# Patient Record
Sex: MALE | Race: BLACK OR AFRICAN AMERICAN | NOT HISPANIC OR LATINO | URBAN - METROPOLITAN AREA
[De-identification: names, ages, dates, MRNs, and addresses within clinical notes are randomized per-mention and may not be internally consistent; named-entity substitution may affect disease eponyms.]

---

## 2017-03-21 ENCOUNTER — OFFICE (OUTPATIENT)
Dept: URBAN - METROPOLITAN AREA CLINIC 12 | Facility: CLINIC | Age: 67
End: 2017-03-21
Payer: MEDICARE

## 2017-03-21 VITALS
HEIGHT: 66 IN | SYSTOLIC BLOOD PRESSURE: 112 MMHG | DIASTOLIC BLOOD PRESSURE: 74 MMHG | HEART RATE: 117 BPM | WEIGHT: 135 LBS

## 2017-03-21 DIAGNOSIS — R13.19 OTHER DYSPHAGIA: ICD-10-CM

## 2017-03-21 DIAGNOSIS — K21.9 GASTRO-ESOPHAGEAL REFLUX DISEASE WITHOUT ESOPHAGITIS: ICD-10-CM

## 2017-03-21 DIAGNOSIS — Z79.01 LONG TERM (CURRENT) USE OF ANTICOAGULANTS: ICD-10-CM

## 2017-03-21 DIAGNOSIS — R10.13 EPIGASTRIC PAIN: ICD-10-CM

## 2017-03-21 PROCEDURE — G8427 DOCREV CUR MEDS BY ELIG CLIN: HCPCS | Performed by: INTERNAL MEDICINE

## 2017-03-21 PROCEDURE — 99204 OFFICE O/P NEW MOD 45 MIN: CPT | Performed by: INTERNAL MEDICINE

## 2017-05-11 ENCOUNTER — AMBULATORY SURGICAL CENTER (OUTPATIENT)
Dept: URBAN - METROPOLITAN AREA SURGERY 3 | Facility: SURGERY | Age: 67
End: 2017-05-11

## 2017-05-11 ENCOUNTER — OFFICE (OUTPATIENT)
Dept: URBAN - METROPOLITAN AREA CLINIC 11 | Facility: CLINIC | Age: 67
End: 2017-05-11

## 2017-05-11 VITALS
HEART RATE: 78 BPM | SYSTOLIC BLOOD PRESSURE: 122 MMHG | SYSTOLIC BLOOD PRESSURE: 115 MMHG | HEART RATE: 88 BPM | OXYGEN SATURATION: 92 % | HEART RATE: 88 BPM | SYSTOLIC BLOOD PRESSURE: 118 MMHG | HEIGHT: 66 IN | OXYGEN SATURATION: 96 % | DIASTOLIC BLOOD PRESSURE: 75 MMHG | OXYGEN SATURATION: 92 % | TEMPERATURE: 97 F | HEART RATE: 82 BPM | RESPIRATION RATE: 15 BRPM | SYSTOLIC BLOOD PRESSURE: 117 MMHG | RESPIRATION RATE: 15 BRPM | OXYGEN SATURATION: 96 % | TEMPERATURE: 97.1 F | WEIGHT: 140 LBS | DIASTOLIC BLOOD PRESSURE: 75 MMHG | RESPIRATION RATE: 20 BRPM | RESPIRATION RATE: 18 BRPM | SYSTOLIC BLOOD PRESSURE: 117 MMHG | SYSTOLIC BLOOD PRESSURE: 137 MMHG | RESPIRATION RATE: 14 BRPM | WEIGHT: 140 LBS | DIASTOLIC BLOOD PRESSURE: 62 MMHG | HEIGHT: 66 IN | DIASTOLIC BLOOD PRESSURE: 74 MMHG | DIASTOLIC BLOOD PRESSURE: 74 MMHG | HEART RATE: 77 BPM | HEART RATE: 77 BPM | SYSTOLIC BLOOD PRESSURE: 115 MMHG | RESPIRATION RATE: 20 BRPM | SYSTOLIC BLOOD PRESSURE: 122 MMHG | OXYGEN SATURATION: 93 % | DIASTOLIC BLOOD PRESSURE: 62 MMHG | HEART RATE: 78 BPM | TEMPERATURE: 97 F | SYSTOLIC BLOOD PRESSURE: 137 MMHG | RESPIRATION RATE: 18 BRPM | HEART RATE: 82 BPM | OXYGEN SATURATION: 93 % | TEMPERATURE: 97.1 F | RESPIRATION RATE: 14 BRPM | SYSTOLIC BLOOD PRESSURE: 118 MMHG

## 2017-05-11 DIAGNOSIS — Z86.010 PERSONAL HISTORY OF COLONIC POLYPS: ICD-10-CM

## 2017-05-11 DIAGNOSIS — K57.30 DIVERTICULOSIS OF LARGE INTESTINE WITHOUT PERFORATION OR ABS: ICD-10-CM

## 2017-05-11 DIAGNOSIS — R10.13 EPIGASTRIC PAIN: ICD-10-CM

## 2017-05-11 DIAGNOSIS — K63.89 OTHER SPECIFIED DISEASES OF INTESTINE: ICD-10-CM

## 2017-05-11 DIAGNOSIS — K22.2 ESOPHAGEAL OBSTRUCTION: ICD-10-CM

## 2017-05-11 DIAGNOSIS — D12.0 BENIGN NEOPLASM OF CECUM: ICD-10-CM

## 2017-05-11 DIAGNOSIS — R13.19 OTHER DYSPHAGIA: ICD-10-CM

## 2017-05-11 DIAGNOSIS — K44.9 DIAPHRAGMATIC HERNIA WITHOUT OBSTRUCTION OR GANGRENE: ICD-10-CM

## 2017-05-11 DIAGNOSIS — K64.8 OTHER HEMORRHOIDS: ICD-10-CM

## 2017-05-11 DIAGNOSIS — D12.2 BENIGN NEOPLASM OF ASCENDING COLON: ICD-10-CM

## 2017-05-11 PROBLEM — K29.70 GASTRITIS, UNSPECIFIED, WITHOUT BLEEDING: Status: ACTIVE | Noted: 2017-05-11

## 2017-05-11 PROCEDURE — 88342 IMHCHEM/IMCYTCHM 1ST ANTB: CPT | Performed by: INTERNAL MEDICINE

## 2017-05-11 PROCEDURE — 45380 COLONOSCOPY AND BIOPSY: CPT | Mod: PT | Performed by: INTERNAL MEDICINE

## 2017-05-11 PROCEDURE — 43248 EGD GUIDE WIRE INSERTION: CPT | Performed by: INTERNAL MEDICINE

## 2017-05-11 PROCEDURE — 43239 EGD BIOPSY SINGLE/MULTIPLE: CPT | Performed by: INTERNAL MEDICINE

## 2017-05-11 PROCEDURE — G8907 PT DOC NO EVENTS ON DISCHARG: HCPCS | Performed by: INTERNAL MEDICINE

## 2017-05-11 PROCEDURE — 88313 SPECIAL STAINS GROUP 2: CPT | Performed by: INTERNAL MEDICINE

## 2017-05-11 PROCEDURE — 88305 TISSUE EXAM BY PATHOLOGIST: CPT | Performed by: INTERNAL MEDICINE

## 2017-05-11 PROCEDURE — G8918 PT W/O PREOP ORDER IV AB PRO: HCPCS | Performed by: INTERNAL MEDICINE

## 2017-10-17 ENCOUNTER — OFFICE (OUTPATIENT)
Dept: URBAN - METROPOLITAN AREA CLINIC 12 | Facility: CLINIC | Age: 67
End: 2017-10-17
Payer: MEDICARE

## 2017-10-17 VITALS
SYSTOLIC BLOOD PRESSURE: 97 MMHG | HEIGHT: 66 IN | DIASTOLIC BLOOD PRESSURE: 69 MMHG | HEART RATE: 106 BPM | WEIGHT: 147 LBS

## 2017-10-17 DIAGNOSIS — R13.10 DYSPHAGIA, UNSPECIFIED: ICD-10-CM

## 2017-10-17 DIAGNOSIS — K21.9 GASTRO-ESOPHAGEAL REFLUX DISEASE WITHOUT ESOPHAGITIS: ICD-10-CM

## 2017-10-17 DIAGNOSIS — T66.XXXS RADIATION SICKNESS, UNSPECIFIED, SEQUELA: ICD-10-CM

## 2017-10-17 PROCEDURE — 99213 OFFICE O/P EST LOW 20 MIN: CPT | Performed by: INTERNAL MEDICINE

## 2017-10-17 PROCEDURE — G8427 DOCREV CUR MEDS BY ELIG CLIN: HCPCS | Performed by: INTERNAL MEDICINE

## 2017-11-23 ENCOUNTER — INPATIENT HOSPITAL (OUTPATIENT)
Dept: URBAN - METROPOLITAN AREA HOSPITAL 130 | Facility: HOSPITAL | Age: 67
End: 2017-11-23

## 2017-11-23 DIAGNOSIS — K92.1 MELENA: ICD-10-CM

## 2017-11-23 PROCEDURE — 99231 SBSQ HOSP IP/OBS SF/LOW 25: CPT | Performed by: INTERNAL MEDICINE

## 2017-11-24 PROCEDURE — 99231 SBSQ HOSP IP/OBS SF/LOW 25: CPT | Performed by: INTERNAL MEDICINE

## 2017-11-25 ENCOUNTER — INPATIENT HOSPITAL (OUTPATIENT)
Dept: URBAN - METROPOLITAN AREA HOSPITAL 130 | Facility: HOSPITAL | Age: 67
End: 2017-11-25

## 2017-11-25 DIAGNOSIS — K92.1 MELENA: ICD-10-CM

## 2017-11-25 PROCEDURE — 99233 SBSQ HOSP IP/OBS HIGH 50: CPT | Performed by: INTERNAL MEDICINE

## 2017-11-26 PROCEDURE — 99233 SBSQ HOSP IP/OBS HIGH 50: CPT | Performed by: INTERNAL MEDICINE

## 2017-11-27 ENCOUNTER — INPATIENT HOSPITAL (OUTPATIENT)
Dept: URBAN - METROPOLITAN AREA HOSPITAL 130 | Facility: HOSPITAL | Age: 67
End: 2017-11-27

## 2017-11-27 DIAGNOSIS — K92.1 MELENA: ICD-10-CM

## 2017-11-27 PROCEDURE — 99231 SBSQ HOSP IP/OBS SF/LOW 25: CPT | Performed by: INTERNAL MEDICINE

## 2017-11-28 ENCOUNTER — INPATIENT HOSPITAL (OUTPATIENT)
Dept: URBAN - METROPOLITAN AREA HOSPITAL 130 | Facility: HOSPITAL | Age: 67
End: 2017-11-28

## 2017-11-28 DIAGNOSIS — K92.1 MELENA: ICD-10-CM

## 2017-11-28 PROCEDURE — 99232 SBSQ HOSP IP/OBS MODERATE 35: CPT | Performed by: INTERNAL MEDICINE

## 2017-12-18 ENCOUNTER — OFFICE (OUTPATIENT)
Dept: URBAN - METROPOLITAN AREA CLINIC 11 | Facility: CLINIC | Age: 67
End: 2017-12-18
Payer: MEDICARE

## 2017-12-18 VITALS
DIASTOLIC BLOOD PRESSURE: 83 MMHG | WEIGHT: 142 LBS | HEIGHT: 66 IN | SYSTOLIC BLOOD PRESSURE: 120 MMHG | HEART RATE: 104 BPM

## 2017-12-18 DIAGNOSIS — T66.XXXS RADIATION SICKNESS, UNSPECIFIED, SEQUELA: ICD-10-CM

## 2017-12-18 DIAGNOSIS — Z79.01 LONG TERM (CURRENT) USE OF ANTICOAGULANTS: ICD-10-CM

## 2017-12-18 DIAGNOSIS — K92.1 MELENA: ICD-10-CM

## 2017-12-18 PROCEDURE — 99213 OFFICE O/P EST LOW 20 MIN: CPT | Performed by: INTERNAL MEDICINE

## 2017-12-18 PROCEDURE — G8427 DOCREV CUR MEDS BY ELIG CLIN: HCPCS | Performed by: INTERNAL MEDICINE

## 2018-01-30 ENCOUNTER — OFFICE (OUTPATIENT)
Dept: URBAN - METROPOLITAN AREA CLINIC 12 | Facility: CLINIC | Age: 68
End: 2018-01-30

## 2018-01-30 VITALS
DIASTOLIC BLOOD PRESSURE: 77 MMHG | SYSTOLIC BLOOD PRESSURE: 113 MMHG | HEART RATE: 100 BPM | WEIGHT: 142 LBS | HEIGHT: 66 IN

## 2018-01-30 DIAGNOSIS — K92.1 MELENA: ICD-10-CM

## 2018-01-30 DIAGNOSIS — C34.90 MALIGNANT NEOPLASM OF UNSPECIFIED PART OF UNSPECIFIED BRONCH: ICD-10-CM

## 2018-01-30 DIAGNOSIS — Z79.01 LONG TERM (CURRENT) USE OF ANTICOAGULANTS: ICD-10-CM

## 2018-01-30 PROCEDURE — 99213 OFFICE O/P EST LOW 20 MIN: CPT | Performed by: INTERNAL MEDICINE

## 2018-04-17 ENCOUNTER — OFFICE (OUTPATIENT)
Dept: URBAN - METROPOLITAN AREA CLINIC 12 | Facility: CLINIC | Age: 68
End: 2018-04-17

## 2018-04-17 VITALS
HEART RATE: 103 BPM | WEIGHT: 146 LBS | SYSTOLIC BLOOD PRESSURE: 114 MMHG | DIASTOLIC BLOOD PRESSURE: 76 MMHG | HEIGHT: 66 IN

## 2018-04-17 DIAGNOSIS — K92.1 MELENA: ICD-10-CM

## 2018-04-17 PROCEDURE — 99213 OFFICE O/P EST LOW 20 MIN: CPT | Performed by: INTERNAL MEDICINE

## 2022-09-07 ENCOUNTER — OFFICE (OUTPATIENT)
Dept: URBAN - METROPOLITAN AREA CLINIC 12 | Facility: CLINIC | Age: 72
End: 2022-09-07

## 2022-09-07 VITALS
DIASTOLIC BLOOD PRESSURE: 75 MMHG | SYSTOLIC BLOOD PRESSURE: 117 MMHG | HEART RATE: 113 BPM | WEIGHT: 129 LBS | OXYGEN SATURATION: 96 % | HEIGHT: 66 IN

## 2022-09-07 DIAGNOSIS — C34.90 MALIGNANT NEOPLASM OF UNSPECIFIED PART OF UNSPECIFIED BRONCH: ICD-10-CM

## 2022-09-07 DIAGNOSIS — K92.1 MELENA: ICD-10-CM

## 2022-09-07 DIAGNOSIS — Z99.81 DEPENDENCE ON SUPPLEMENTAL OXYGEN: ICD-10-CM

## 2022-09-07 PROCEDURE — 99203 OFFICE O/P NEW LOW 30 MIN: CPT | Performed by: INTERNAL MEDICINE

## 2022-09-07 RX ORDER — POLYETHYLENE GLYCOL 3350, SODIUM SULFATE, SODIUM CHLORIDE, POTASSIUM CHLORIDE, ASCORBIC ACID, SODIUM ASCORBATE 140-9-5.2G
KIT ORAL
Qty: 1 | Refills: 0 | Status: ACTIVE
Start: 2022-09-07

## 2022-11-09 ENCOUNTER — ON CAMPUS - OUTPATIENT (OUTPATIENT)
Dept: URBAN - METROPOLITAN AREA HOSPITAL 97 | Facility: HOSPITAL | Age: 72
End: 2022-11-09

## 2022-11-09 DIAGNOSIS — D12.2 BENIGN NEOPLASM OF ASCENDING COLON: ICD-10-CM

## 2022-11-09 DIAGNOSIS — K63.0 ABSCESS OF INTESTINE: ICD-10-CM

## 2022-11-09 DIAGNOSIS — K57.30 DIVERTICULOSIS OF LARGE INTESTINE WITHOUT PERFORATION OR ABS: ICD-10-CM

## 2022-11-09 DIAGNOSIS — K92.1 MELENA: ICD-10-CM

## 2022-11-09 PROCEDURE — 45380 COLONOSCOPY AND BIOPSY: CPT | Performed by: INTERNAL MEDICINE

## 2023-03-08 ENCOUNTER — OFFICE (OUTPATIENT)
Dept: URBAN - METROPOLITAN AREA CLINIC 12 | Facility: CLINIC | Age: 73
End: 2023-03-08

## 2023-03-08 VITALS
WEIGHT: 115 LBS | SYSTOLIC BLOOD PRESSURE: 108 MMHG | HEIGHT: 65 IN | OXYGEN SATURATION: 95 % | DIASTOLIC BLOOD PRESSURE: 68 MMHG | HEART RATE: 130 BPM

## 2023-03-08 DIAGNOSIS — Z99.81 DEPENDENCE ON SUPPLEMENTAL OXYGEN: ICD-10-CM

## 2023-03-08 DIAGNOSIS — R63.4 ABNORMAL WEIGHT LOSS: ICD-10-CM

## 2023-03-08 DIAGNOSIS — K92.1 MELENA: ICD-10-CM

## 2023-03-08 DIAGNOSIS — C34.90 MALIGNANT NEOPLASM OF UNSPECIFIED PART OF UNSPECIFIED BRONCH: ICD-10-CM

## 2023-03-08 PROCEDURE — 99214 OFFICE O/P EST MOD 30 MIN: CPT | Performed by: INTERNAL MEDICINE

## 2023-05-09 ENCOUNTER — INPATIENT HOSPITAL (OUTPATIENT)
Dept: URBAN - METROPOLITAN AREA HOSPITAL 130 | Facility: HOSPITAL | Age: 73
End: 2023-05-09

## 2023-05-09 DIAGNOSIS — D64.9 ANEMIA, UNSPECIFIED: ICD-10-CM

## 2023-05-09 DIAGNOSIS — K92.1 MELENA: ICD-10-CM

## 2023-05-09 DIAGNOSIS — R93.3 ABNORMAL FINDINGS ON DIAGNOSTIC IMAGING OF OTHER PARTS OF DI: ICD-10-CM

## 2023-05-09 PROCEDURE — 99222 1ST HOSP IP/OBS MODERATE 55: CPT | Performed by: INTERNAL MEDICINE

## 2023-05-10 ENCOUNTER — INPATIENT HOSPITAL (OUTPATIENT)
Dept: URBAN - METROPOLITAN AREA HOSPITAL 130 | Facility: HOSPITAL | Age: 73
End: 2023-05-10

## 2023-05-10 DIAGNOSIS — K92.2 GASTROINTESTINAL HEMORRHAGE, UNSPECIFIED: ICD-10-CM

## 2023-05-10 DIAGNOSIS — R93.3 ABNORMAL FINDINGS ON DIAGNOSTIC IMAGING OF OTHER PARTS OF DI: ICD-10-CM

## 2023-05-10 DIAGNOSIS — K52.9 NONINFECTIVE GASTROENTERITIS AND COLITIS, UNSPECIFIED: ICD-10-CM

## 2023-05-10 DIAGNOSIS — K57.30 DIVERTICULOSIS OF LARGE INTESTINE WITHOUT PERFORATION OR ABS: ICD-10-CM

## 2023-05-10 PROCEDURE — 45380 COLONOSCOPY AND BIOPSY: CPT | Performed by: INTERNAL MEDICINE

## 2023-05-11 ENCOUNTER — INPATIENT HOSPITAL (OUTPATIENT)
Dept: URBAN - METROPOLITAN AREA HOSPITAL 130 | Facility: HOSPITAL | Age: 73
End: 2023-05-11

## 2023-05-11 DIAGNOSIS — K52.9 NONINFECTIVE GASTROENTERITIS AND COLITIS, UNSPECIFIED: ICD-10-CM

## 2023-05-11 DIAGNOSIS — K92.1 MELENA: ICD-10-CM

## 2023-05-11 DIAGNOSIS — R93.3 ABNORMAL FINDINGS ON DIAGNOSTIC IMAGING OF OTHER PARTS OF DI: ICD-10-CM

## 2023-05-11 DIAGNOSIS — D64.9 ANEMIA, UNSPECIFIED: ICD-10-CM

## 2023-05-11 PROCEDURE — 99232 SBSQ HOSP IP/OBS MODERATE 35: CPT | Performed by: INTERNAL MEDICINE

## 2023-05-12 ENCOUNTER — INPATIENT HOSPITAL (OUTPATIENT)
Dept: URBAN - METROPOLITAN AREA HOSPITAL 130 | Facility: HOSPITAL | Age: 73
End: 2023-05-12

## 2023-05-12 DIAGNOSIS — R93.3 ABNORMAL FINDINGS ON DIAGNOSTIC IMAGING OF OTHER PARTS OF DI: ICD-10-CM

## 2023-05-12 DIAGNOSIS — K52.9 NONINFECTIVE GASTROENTERITIS AND COLITIS, UNSPECIFIED: ICD-10-CM

## 2023-05-12 DIAGNOSIS — K92.1 MELENA: ICD-10-CM

## 2023-05-12 DIAGNOSIS — D64.9 ANEMIA, UNSPECIFIED: ICD-10-CM

## 2023-05-12 PROCEDURE — 99232 SBSQ HOSP IP/OBS MODERATE 35: CPT | Performed by: INTERNAL MEDICINE

## 2023-05-17 ENCOUNTER — OFFICE (OUTPATIENT)
Dept: URBAN - METROPOLITAN AREA CLINIC 12 | Facility: CLINIC | Age: 73
End: 2023-05-17

## 2023-05-17 VITALS
WEIGHT: 117 LBS | OXYGEN SATURATION: 94 % | DIASTOLIC BLOOD PRESSURE: 58 MMHG | HEART RATE: 125 BPM | SYSTOLIC BLOOD PRESSURE: 93 MMHG | HEIGHT: 66 IN

## 2023-05-17 DIAGNOSIS — K92.1 MELENA: ICD-10-CM

## 2023-05-17 DIAGNOSIS — K52.9 NONINFECTIVE GASTROENTERITIS AND COLITIS, UNSPECIFIED: ICD-10-CM

## 2023-05-17 PROCEDURE — 99214 OFFICE O/P EST MOD 30 MIN: CPT | Performed by: INTERNAL MEDICINE

## 2023-05-17 RX ORDER — PREDNISONE 10 MG/1
TABLET ORAL
Qty: 70 | Refills: 0 | Status: COMPLETED
Start: 2023-05-17 | End: 2023-08-01

## 2023-06-27 ENCOUNTER — INPATIENT HOSPITAL (OUTPATIENT)
Dept: URBAN - METROPOLITAN AREA HOSPITAL 130 | Facility: HOSPITAL | Age: 73
End: 2023-06-27

## 2023-06-27 DIAGNOSIS — K52.9 NONINFECTIVE GASTROENTERITIS AND COLITIS, UNSPECIFIED: ICD-10-CM

## 2023-06-27 DIAGNOSIS — J44.9 CHRONIC OBSTRUCTIVE PULMONARY DISEASE, UNSPECIFIED: ICD-10-CM

## 2023-06-27 DIAGNOSIS — R93.3 ABNORMAL FINDINGS ON DIAGNOSTIC IMAGING OF OTHER PARTS OF DI: ICD-10-CM

## 2023-06-27 DIAGNOSIS — I10 ESSENTIAL (PRIMARY) HYPERTENSION: ICD-10-CM

## 2023-06-27 DIAGNOSIS — E78.5 HYPERLIPIDEMIA, UNSPECIFIED: ICD-10-CM

## 2023-06-27 DIAGNOSIS — D64.9 ANEMIA, UNSPECIFIED: ICD-10-CM

## 2023-06-27 DIAGNOSIS — K92.1 MELENA: ICD-10-CM

## 2023-06-27 PROCEDURE — 99222 1ST HOSP IP/OBS MODERATE 55: CPT | Performed by: INTERNAL MEDICINE

## 2023-06-28 ENCOUNTER — INPATIENT HOSPITAL (OUTPATIENT)
Dept: URBAN - METROPOLITAN AREA HOSPITAL 130 | Facility: HOSPITAL | Age: 73
End: 2023-06-28

## 2023-06-28 DIAGNOSIS — R93.3 ABNORMAL FINDINGS ON DIAGNOSTIC IMAGING OF OTHER PARTS OF DI: ICD-10-CM

## 2023-06-28 DIAGNOSIS — K92.1 MELENA: ICD-10-CM

## 2023-06-28 DIAGNOSIS — K52.9 NONINFECTIVE GASTROENTERITIS AND COLITIS, UNSPECIFIED: ICD-10-CM

## 2023-06-28 DIAGNOSIS — D64.9 ANEMIA, UNSPECIFIED: ICD-10-CM

## 2023-06-28 PROCEDURE — 45331 SIGMOIDOSCOPY AND BIOPSY: CPT | Performed by: INTERNAL MEDICINE

## 2023-06-29 ENCOUNTER — INPATIENT HOSPITAL (OUTPATIENT)
Dept: URBAN - METROPOLITAN AREA HOSPITAL 130 | Facility: HOSPITAL | Age: 73
End: 2023-06-29

## 2023-06-29 DIAGNOSIS — D64.9 ANEMIA, UNSPECIFIED: ICD-10-CM

## 2023-06-29 DIAGNOSIS — K92.1 MELENA: ICD-10-CM

## 2023-06-29 DIAGNOSIS — K52.9 NONINFECTIVE GASTROENTERITIS AND COLITIS, UNSPECIFIED: ICD-10-CM

## 2023-06-29 DIAGNOSIS — R93.3 ABNORMAL FINDINGS ON DIAGNOSTIC IMAGING OF OTHER PARTS OF DI: ICD-10-CM

## 2023-06-29 PROCEDURE — 99232 SBSQ HOSP IP/OBS MODERATE 35: CPT | Performed by: INTERNAL MEDICINE

## 2023-07-19 ENCOUNTER — OFFICE (OUTPATIENT)
Dept: URBAN - METROPOLITAN AREA CLINIC 12 | Facility: CLINIC | Age: 73
End: 2023-07-19

## 2023-07-19 VITALS
HEIGHT: 65 IN | WEIGHT: 116 LBS | DIASTOLIC BLOOD PRESSURE: 90 MMHG | SYSTOLIC BLOOD PRESSURE: 141 MMHG | OXYGEN SATURATION: 97 % | HEART RATE: 111 BPM

## 2023-07-19 DIAGNOSIS — K52.9 NONINFECTIVE GASTROENTERITIS AND COLITIS, UNSPECIFIED: ICD-10-CM

## 2023-07-19 DIAGNOSIS — K92.1 MELENA: ICD-10-CM

## 2023-07-19 PROCEDURE — 99214 OFFICE O/P EST MOD 30 MIN: CPT | Performed by: INTERNAL MEDICINE

## 2023-07-19 RX ORDER — MESALAMINE 1.2 G/1
TABLET, DELAYED RELEASE ORAL
Qty: 60 | Refills: 1 | Status: COMPLETED
Start: 2023-07-19 | End: 2023-08-01

## 2023-09-13 ENCOUNTER — OFFICE (OUTPATIENT)
Dept: URBAN - METROPOLITAN AREA CLINIC 12 | Facility: CLINIC | Age: 73
End: 2023-09-13

## 2023-09-13 VITALS
WEIGHT: 114 LBS | SYSTOLIC BLOOD PRESSURE: 114 MMHG | OXYGEN SATURATION: 96 % | DIASTOLIC BLOOD PRESSURE: 76 MMHG | HEIGHT: 65 IN | HEART RATE: 114 BPM

## 2023-09-13 DIAGNOSIS — K52.9 NONINFECTIVE GASTROENTERITIS AND COLITIS, UNSPECIFIED: ICD-10-CM

## 2023-09-13 PROCEDURE — 99213 OFFICE O/P EST LOW 20 MIN: CPT | Performed by: INTERNAL MEDICINE

## 2023-09-15 LAB
CREATININE: 0.81 MG/DL (ref 0.76–1.27)
CREATININE: EGFR: 93 ML/MIN/1.73 (ref 59–?)
NTI CLEAN VIAL STL (PP WHITE): (no result)

## 2023-12-20 ENCOUNTER — OFFICE (OUTPATIENT)
Dept: URBAN - METROPOLITAN AREA CLINIC 12 | Facility: CLINIC | Age: 73
End: 2023-12-20

## 2023-12-20 VITALS
SYSTOLIC BLOOD PRESSURE: 136 MMHG | OXYGEN SATURATION: 94 % | HEIGHT: 65 IN | WEIGHT: 118 LBS | HEART RATE: 99 BPM | DIASTOLIC BLOOD PRESSURE: 81 MMHG

## 2023-12-20 DIAGNOSIS — K21.9 GASTRO-ESOPHAGEAL REFLUX DISEASE WITHOUT ESOPHAGITIS: ICD-10-CM

## 2023-12-20 DIAGNOSIS — K52.9 NONINFECTIVE GASTROENTERITIS AND COLITIS, UNSPECIFIED: ICD-10-CM

## 2023-12-20 PROCEDURE — 99214 OFFICE O/P EST MOD 30 MIN: CPT | Performed by: INTERNAL MEDICINE

## 2023-12-20 RX ORDER — MESALAMINE 1.2 G/1
2.4 TABLET, DELAYED RELEASE ORAL
Qty: 60 | Refills: 1 | Status: ACTIVE
Start: 2023-07-27

## 2023-12-30 LAB — CALPROTECTIN, FECAL: 672 UG/G — HIGH (ref 0–120)

## 2024-01-24 ENCOUNTER — AMBULATORY SURGICAL CENTER (OUTPATIENT)
Dept: URBAN - METROPOLITAN AREA SURGERY 2 | Facility: SURGERY | Age: 74
End: 2024-01-24

## 2024-01-24 ENCOUNTER — OFFICE (OUTPATIENT)
Dept: URBAN - METROPOLITAN AREA PATHOLOGY 12 | Facility: PATHOLOGY | Age: 74
End: 2024-01-24

## 2024-01-24 VITALS
TEMPERATURE: 97.3 F | HEART RATE: 99 BPM | WEIGHT: 117 LBS | SYSTOLIC BLOOD PRESSURE: 98 MMHG | DIASTOLIC BLOOD PRESSURE: 84 MMHG | HEART RATE: 88 BPM | SYSTOLIC BLOOD PRESSURE: 109 MMHG | SYSTOLIC BLOOD PRESSURE: 98 MMHG | HEART RATE: 88 BPM | RESPIRATION RATE: 15 BRPM | SYSTOLIC BLOOD PRESSURE: 198 MMHG | HEART RATE: 90 BPM | SYSTOLIC BLOOD PRESSURE: 104 MMHG | HEART RATE: 99 BPM | SYSTOLIC BLOOD PRESSURE: 141 MMHG | SYSTOLIC BLOOD PRESSURE: 109 MMHG | HEART RATE: 90 BPM | DIASTOLIC BLOOD PRESSURE: 61 MMHG | SYSTOLIC BLOOD PRESSURE: 141 MMHG | WEIGHT: 117 LBS | HEART RATE: 96 BPM | TEMPERATURE: 97.3 F | HEART RATE: 90 BPM | DIASTOLIC BLOOD PRESSURE: 59 MMHG | SYSTOLIC BLOOD PRESSURE: 109 MMHG | SYSTOLIC BLOOD PRESSURE: 198 MMHG | HEART RATE: 88 BPM | DIASTOLIC BLOOD PRESSURE: 60 MMHG | SYSTOLIC BLOOD PRESSURE: 141 MMHG | HEIGHT: 65 IN | DIASTOLIC BLOOD PRESSURE: 64 MMHG | WEIGHT: 117 LBS | RESPIRATION RATE: 20 BRPM | RESPIRATION RATE: 15 BRPM | RESPIRATION RATE: 20 BRPM | TEMPERATURE: 97.3 F | OXYGEN SATURATION: 97 % | OXYGEN SATURATION: 100 % | OXYGEN SATURATION: 100 % | DIASTOLIC BLOOD PRESSURE: 60 MMHG | SYSTOLIC BLOOD PRESSURE: 104 MMHG | SYSTOLIC BLOOD PRESSURE: 198 MMHG | OXYGEN SATURATION: 99 % | DIASTOLIC BLOOD PRESSURE: 59 MMHG | DIASTOLIC BLOOD PRESSURE: 84 MMHG | TEMPERATURE: 98.2 F | DIASTOLIC BLOOD PRESSURE: 60 MMHG | DIASTOLIC BLOOD PRESSURE: 61 MMHG | RESPIRATION RATE: 16 BRPM | RESPIRATION RATE: 20 BRPM | DIASTOLIC BLOOD PRESSURE: 64 MMHG | OXYGEN SATURATION: 99 % | HEART RATE: 96 BPM | OXYGEN SATURATION: 97 % | RESPIRATION RATE: 15 BRPM | RESPIRATION RATE: 16 BRPM | RESPIRATION RATE: 18 BRPM | HEART RATE: 99 BPM | SYSTOLIC BLOOD PRESSURE: 98 MMHG | HEIGHT: 65 IN | RESPIRATION RATE: 18 BRPM | HEART RATE: 96 BPM | HEART RATE: 92 BPM | HEART RATE: 92 BPM | HEART RATE: 92 BPM | HEIGHT: 65 IN | DIASTOLIC BLOOD PRESSURE: 84 MMHG | TEMPERATURE: 98.2 F | OXYGEN SATURATION: 99 % | OXYGEN SATURATION: 97 % | RESPIRATION RATE: 16 BRPM | RESPIRATION RATE: 18 BRPM | TEMPERATURE: 98.2 F | DIASTOLIC BLOOD PRESSURE: 59 MMHG | SYSTOLIC BLOOD PRESSURE: 104 MMHG | DIASTOLIC BLOOD PRESSURE: 61 MMHG | DIASTOLIC BLOOD PRESSURE: 64 MMHG | OXYGEN SATURATION: 100 %

## 2024-01-24 DIAGNOSIS — C79.9 SECONDARY MALIGNANT NEOPLASM OF UNSPECIFIED SITE: ICD-10-CM

## 2024-01-24 DIAGNOSIS — K63.89 OTHER SPECIFIED DISEASES OF INTESTINE: ICD-10-CM

## 2024-01-24 DIAGNOSIS — K57.30 DIVERTICULOSIS OF LARGE INTESTINE WITHOUT PERFORATION OR ABS: ICD-10-CM

## 2024-01-24 DIAGNOSIS — R19.7 DIARRHEA, UNSPECIFIED: ICD-10-CM

## 2024-01-24 DIAGNOSIS — K51.90 ULCERATIVE COLITIS, UNSPECIFIED, WITHOUT COMPLICATIONS: ICD-10-CM

## 2024-01-24 PROBLEM — K92.2 GASTROINTESTINAL HEMORRHAGE, UNSPECIFIED: Status: ACTIVE | Noted: 2024-01-24

## 2024-01-24 PROBLEM — K52.89 INFLAMMATORY BOWEL DISEASE OF THE INTESTINE IF MORE PRECISE DIAGNOSIS OR DETERMINATION OF THE EXTENT/SEVERITY OF ACTIVITY OF DISEASE WILL INFLUENCE IMMEDIATE/FUTURE MANAGEMENT: Status: ACTIVE | Noted: 2024-01-24

## 2024-01-24 PROCEDURE — 88342 IMHCHEM/IMCYTCHM 1ST ANTB: CPT | Performed by: STUDENT IN AN ORGANIZED HEALTH CARE EDUCATION/TRAINING PROGRAM

## 2024-01-24 PROCEDURE — 45331 SIGMOIDOSCOPY AND BIOPSY: CPT | Performed by: INTERNAL MEDICINE

## 2024-01-24 PROCEDURE — 88341 IMHCHEM/IMCYTCHM EA ADD ANTB: CPT | Performed by: STUDENT IN AN ORGANIZED HEALTH CARE EDUCATION/TRAINING PROGRAM

## 2024-01-24 PROCEDURE — 88305 TISSUE EXAM BY PATHOLOGIST: CPT | Performed by: STUDENT IN AN ORGANIZED HEALTH CARE EDUCATION/TRAINING PROGRAM

## 2024-01-24 PROCEDURE — 88313 SPECIAL STAINS GROUP 2: CPT | Performed by: STUDENT IN AN ORGANIZED HEALTH CARE EDUCATION/TRAINING PROGRAM

## 2024-01-29 LAB
GASTRO ONE PATHOLOGY: PDF REPORT: (no result)

## 2024-01-31 ENCOUNTER — OFFICE (OUTPATIENT)
Dept: URBAN - METROPOLITAN AREA CLINIC 12 | Facility: CLINIC | Age: 74
End: 2024-01-31

## 2024-01-31 DIAGNOSIS — K52.9 NONINFECTIVE GASTROENTERITIS AND COLITIS, UNSPECIFIED: ICD-10-CM

## 2024-01-31 PROCEDURE — 99426 PRIN CARE MGMT STAFF 1ST 30: CPT | Performed by: INTERNAL MEDICINE

## 2024-02-29 ENCOUNTER — OFFICE (OUTPATIENT)
Dept: URBAN - METROPOLITAN AREA CLINIC 12 | Facility: CLINIC | Age: 74
End: 2024-02-29
Payer: COMMERCIAL

## 2024-02-29 DIAGNOSIS — K52.9 NONINFECTIVE GASTROENTERITIS AND COLITIS, UNSPECIFIED: ICD-10-CM

## 2024-02-29 PROCEDURE — 99426 PRIN CARE MGMT STAFF 1ST 30: CPT | Performed by: INTERNAL MEDICINE

## 2024-03-31 ENCOUNTER — OFFICE (OUTPATIENT)
Dept: URBAN - METROPOLITAN AREA CLINIC 12 | Facility: CLINIC | Age: 74
End: 2024-03-31
Payer: COMMERCIAL

## 2024-03-31 DIAGNOSIS — K52.9 NONINFECTIVE GASTROENTERITIS AND COLITIS, UNSPECIFIED: ICD-10-CM

## 2024-03-31 PROCEDURE — 99426 PRIN CARE MGMT STAFF 1ST 30: CPT | Performed by: INTERNAL MEDICINE

## 2024-06-30 ENCOUNTER — OFFICE (OUTPATIENT)
Dept: URBAN - METROPOLITAN AREA CLINIC 12 | Facility: CLINIC | Age: 74
End: 2024-06-30
Payer: COMMERCIAL

## 2024-06-30 DIAGNOSIS — K52.9 NONINFECTIVE GASTROENTERITIS AND COLITIS, UNSPECIFIED: ICD-10-CM

## 2024-06-30 PROCEDURE — 99426 PRIN CARE MGMT STAFF 1ST 30: CPT | Performed by: INTERNAL MEDICINE

## 2024-06-30 PROCEDURE — 99427 PRIN CARE MGMT STAFF EA ADDL: CPT | Performed by: INTERNAL MEDICINE

## 2025-02-04 ENCOUNTER — OFFICE (OUTPATIENT)
Dept: URBAN - METROPOLITAN AREA CLINIC 11 | Facility: CLINIC | Age: 75
End: 2025-02-04
Payer: MEDICARE

## 2025-02-04 VITALS
WEIGHT: 128 LBS | DIASTOLIC BLOOD PRESSURE: 75 MMHG | HEART RATE: 52 BPM | SYSTOLIC BLOOD PRESSURE: 127 MMHG | OXYGEN SATURATION: 94 % | HEIGHT: 65 IN

## 2025-02-04 DIAGNOSIS — C34.90 MALIGNANT NEOPLASM OF UNSPECIFIED PART OF UNSPECIFIED BRONCH: ICD-10-CM

## 2025-02-04 DIAGNOSIS — K62.5 HEMORRHAGE OF ANUS AND RECTUM: ICD-10-CM

## 2025-02-04 DIAGNOSIS — Z79.01 LONG TERM (CURRENT) USE OF ANTICOAGULANTS: ICD-10-CM

## 2025-02-04 DIAGNOSIS — R19.7 DIARRHEA, UNSPECIFIED: ICD-10-CM

## 2025-02-04 DIAGNOSIS — R20.8 OTHER DISTURBANCES OF SKIN SENSATION: ICD-10-CM

## 2025-02-04 DIAGNOSIS — K52.9 NONINFECTIVE GASTROENTERITIS AND COLITIS, UNSPECIFIED: ICD-10-CM

## 2025-02-04 PROCEDURE — 99214 OFFICE O/P EST MOD 30 MIN: CPT

## 2025-02-04 NOTE — SERVICEHPINOTES
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 
br
brClinic Visit 5/17/2023 with Dr. Everton Kunz:br 72-year-old  male hospital follow-up. he was in the hospital earlier this month for hematochezia. minimal diarrhea and no significant abdominal pain. he reports bleeding to be red in color. he is also on Eliquis. colonoscopy showed for moderate to severe colitis involving descending and sigmoid colon and rectum in a continuous fashion - there was erythema friability and granularity but no ulcerations. he had a colonoscopy for similar indication in November 2022 - at which point erythema and congestion throughout the colon with sparing of some areas of rectum although there was involvement. path this time May 2023 - evidence of chronic active colitis in descending sigmoid and rectum - no dysplasia in either location - and pathologically it was suggestive of idiopathic IBD. CMV negative. path from November 2022 - also showed evidence of chronic colitis throughout the colon including rectum with crypt abscesses, crypt architectural distortion, lamina propria lymphoplasmacytosis.- at this time he feels okay, denies any severe diarrhea but mildly loose stool - no large volume watery diarrhea, denies any significant abdominal pain, still sees blood per rectum - on Eliquis. hemoglobin in the hospital May 2023 was 9 g/dl - did not require transfusion.- it is to be noted that he had a colonoscopy - in 2017 at which point there was no evidence of colitis. he has been on Keytruda for about 5 or 6 years./
br
br 
Clinic Visit 9/13/2023 with Dr. Kunz
[id number]  male for follow-up 2 months. he has no rectal bleeding at this point. stool are solid. he is on mesalamine 2.4 g per day. he is also off of any steroidsbrflexible sigmoidoscopy showed evidence of colitis noted in sigmoid colon and reportedly rectum was normal on a flexible sigmoidoscopy June 2023. pathology from the sigmoid colon was consistent with chronic colitis. this is thought to be either related to ulcerative colitis or immune check point inhibitor colitis. he is now off of the Keytruda. I have had discussion with Dr. Horton on his previous visit in May 2023 - and plan was to hold Keytruda. at this time he is not having any abdominal pain diarrhea rectal bleeding. no abdominal pain and he feels well. he is on oxygen. he has no nausea vomiting dyspepsia.
br
brColonoscopy/Sigmoidoscopy- Dr. Kunz 1/24/2024:br Friability, erythema, luminal narrowing and congestion in the distal sigmoid colon compatible with Unspecified Colitis. br (Pulmonary Adenocarcinoma on bx- results sent to Dr. Horton).  Colon bx do not show any evidence of active IBD (no CD or UC). Normal mucosa in the remainder of the colon. Colonoscope could be advanced to cecum Fair Prep	Mild diverticulosis of the transverse colon and descending colon.	Moderate diverticulosis of the sigmoid colon. br
br 
Clinic Visit 9/5/2024 with NP Delfina Schuster:
[id number]  male here with me for follow-up. He reports he is doing well at this time. No nausea. No vomiting. No diarrhea. No constipation. No abdominal pain. No rectal bleeding. He reports he has 1 bowel movement a day. No melena. Reports he is doing well overall he uses 2 L of oxygen he is on Eliquis for history of PE and DVT. He followed up with the Hematology Dr. Dr. Horton about 2 weeks ago reports labs done at that time. Reports no issues going on..brhistory:br72-year-old  male for follow-up 3 months. he has no rectal bleeding at this point. stool are solid. he is on mesalamine 2.4 g per day. he is also off of any steroidsbrflexible sigmoidoscopy May 2023 showed evidence of procto-colitis noted in descending/sigmoid colon/rectum. Another FSC in June 2023 - sigmoid colitis but reportedly rectum was normal. pathology from the sigmoid colon was consistent with chronic active colitis. this is thought to be either related to ulcerative colitis or immune check point inhibitor colitis but UC more likely. Colonoscopy 2022 - also mild chronic colitis (see below). he is now off of the Keytruda. I have had discussion with Dr. Horton on his previous visit in May 2023. at this time he is not having any abdominal pain diarrhea rectal bleeding. no abdominal pain and he feels well. he is on oxygen. he has no nausea vomiting dyspepsia. Creat nml / Hb 12.9 in Nov 2023   Clinic Visit   2/4/2025   :
brThe patient, with indeterminate colitis and a history of lung and colon cancer, presents with rectal bleeding and diarrhea. He has been experiencing rectal bleeding and diarrhea for the past two weeks. The bleeding is described as BRB on toilet paper and in the toilet, not mixed with stool. There is also burning sensation during bowel movements after the diarrhea. He has bowel movements approximately five times a day due to diarrhea. No abdominal pain, nausea, or vomiting. He has a history of indeterminate colitis, with a differential diagnosis of ulcerative colitis versus immune-mediated colitis, more likely ulcerative colitis. He is on mesalamine, taking two pills daily, each 1.2 grams. For the past three months he has been the immunotherapy regimen of Opdivo and Yervoy, with Opdivo administered alone for two treatments and both drugs administered together on the fourth treatment. He has been prescribed prednisone 20 mg and Imodium for diarrhea management by another provider, but has not started either. He reports he underwent a colon resection for colon cancer in April 2024 at [facility]. I will request a copy of this report. His last colonoscopy/flex sig? was in January 2024 which showed no active inflammatory bowel disease but did reveal metastatic pulmonary cancer in the sigmoid area.  He is on 2L O2. He continues to take Eliquis for his history of PE and DVT. Lab work yesterday at [facility] was as follows: Ferritin 12.4, Iron 30, Iron Sat 7%, TIBC wnl, WBC wnl, Hgb 12.5, Hct 38.8.  br

## 2025-02-04 NOTE — SERVICENOTES
Patient reports for the past 2 weeks he has been experiencing BRBPR noted on the toilet paper, diarrhea and rectal burning.  He continues to take 2.4 g mesalamine daily.  will check a stool studies for infections as well as a stool calprotectin.  will also check ESR and CRP.  He had lab work drawn yesterday at [facility] and his WBC was normal.  Dr. Horton ordered 20 mg prednisone and Imodium yesterday and he is planning to start that today- Dr. Horton suspects it is the Yervoy/Opdivo immunotherapy causing his change in bowel habits.  all questions addressed.  Previous records reviewed.

## 2025-02-12 LAB
C DIFFICILE TOXINS A+B, EIA: NEGATIVE
CALPROTECTIN, FECAL: 795 UG/G — HIGH (ref 0–120)
GIARDIA, EIA, OVA/PARASITE: GIARDIA LAMBLIA AG, EIA: NEGATIVE
GIARDIA, EIA, OVA/PARASITE: OVA + PARASITE EXAM: (no result)
GIARDIA, EIA, OVA/PARASITE: RESULT 1: (no result)
STOOL CULTURE: CAMPYLOBACTER CULTURE: (no result)
STOOL CULTURE: E COLI SHIGA TOXIN EIA: NEGATIVE
STOOL CULTURE: RESULT 1: (no result)
STOOL CULTURE: RESULT 1: (no result)
STOOL CULTURE: SALMONELLA/SHIGELLA SCREEN: (no result)

## 2025-04-04 ENCOUNTER — OFFICE (OUTPATIENT)
Dept: URBAN - METROPOLITAN AREA CLINIC 11 | Facility: CLINIC | Age: 75
End: 2025-04-04
Payer: MEDICARE

## 2025-04-04 VITALS
DIASTOLIC BLOOD PRESSURE: 65 MMHG | SYSTOLIC BLOOD PRESSURE: 134 MMHG | OXYGEN SATURATION: 98 % | HEIGHT: 65 IN | WEIGHT: 126 LBS | HEART RATE: 102 BPM

## 2025-04-04 DIAGNOSIS — K62.89 OTHER SPECIFIED DISEASES OF ANUS AND RECTUM: ICD-10-CM

## 2025-04-04 DIAGNOSIS — R19.4 CHANGE IN BOWEL HABIT: ICD-10-CM

## 2025-04-04 DIAGNOSIS — K64.5 PERIANAL VENOUS THROMBOSIS: ICD-10-CM

## 2025-04-04 DIAGNOSIS — R19.5 OTHER FECAL ABNORMALITIES: ICD-10-CM

## 2025-04-04 DIAGNOSIS — K62.5 HEMORRHAGE OF ANUS AND RECTUM: ICD-10-CM

## 2025-04-04 DIAGNOSIS — L29.0 PRURITUS ANI: ICD-10-CM

## 2025-04-04 PROCEDURE — 99214 OFFICE O/P EST MOD 30 MIN: CPT

## 2025-04-04 RX ORDER — HYDROCORTISONE ACETATE AND PRAMOXINE HYDROCHLORIDE 25; 10 MG/G; MG/G
CREAM TOPICAL
Qty: 1 | Refills: 6 | Status: ACTIVE
Start: 2025-04-04

## 2025-04-04 NOTE — SERVICEHPINOTES
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 Clinic Visit 5/17/2023 with Dr. Everton Kunz:[id number]  male hospital follow-up. he was in the hospital earlier this month for hematochezia. minimal diarrhea and no significant abdominal pain. he reports bleeding to be red in color. he is also on Eliquis. colonoscopy showed for moderate to severe colitis involving descending and sigmoid colon and rectum in a continuous fashion - there was erythema friability and granularity but no ulcerations. he had a colonoscopy for similar indication in November 2022 - at which point erythema and congestion throughout the colon with sparing of some areas of rectum although there was involvement. path this time May 2023 - evidence of chronic active colitis in descending sigmoid and rectum - no dysplasia in either location - and pathologically it was suggestive of idiopathic IBD. CMV negative. path from November 2022 - also showed evidence of chronic colitis throughout the colon including rectum with crypt abscesses, crypt architectural distortion, lamina propria lymphoplasmacytosis.- at this time he feels okay, denies any severe diarrhea but mildly loose stool - no large volume watery diarrhea, denies any significant abdominal pain, still sees blood per rectum - on Eliquis. hemoglobin in the hospital May 2023 was 9 g/dl - did not require transfusion.- it is to be noted that he had a colonoscopy - in 2017 at which point there was no evidence of colitis. he has been on Keytruda for about 5 or 6 years./Clinic Visit 9/13/2023 with Dr. Kunzbr72-year-old  male for follow-up 2 months. he has no rectal bleeding at this point. stool are solid. he is on mesalamine 2.4 g per day. he is also off of any steroidsbrflexible sigmoidoscopy showed evidence of colitis noted in sigmoid colon and reportedly rectum was normal on a flexible sigmoidoscopy June 2023. pathology from the sigmoid colon was consistent with chronic colitis. this is thought to be either related to ulcerative colitis or immune check point inhibitor colitis. he is now off of the Keytruda. I have had discussion with Dr. Horton on his previous visit in May 2023 - and plan was to hold Keytruda. at this time he is not having any abdominal pain diarrhea rectal bleeding. no abdominal pain and he feels well. he is on oxygen. he has no nausea vomiting dyspepsia.Colonoscopy/Sigmoidoscopy- Dr. Kunz 1/24/2024:brFriability, erythema, luminal narrowing and congestion in the distal sigmoid colon compatible with Unspecified Colitis.br(Pulmonary Adenocarcinoma on bx- results sent to Dr. Horton). Colon bx do not show any evidence of active IBD (no CD or UC). Normal mucosa in the remainder of the colon. Colonoscope could be advanced to cecum Fair Prep Mild diverticulosis of the transverse colon and descending colon. Moderate diverticulosis of the sigmoid colon.Clinic Visit 9/5/2024 with NP Delfina Schuster:[id number]  male here with me for follow-up. He reports he is doing well at this time. No nausea. No vomiting. No diarrhea. No constipation. No abdominal pain. No rectal bleeding. He reports he has 1 bowel movement a day. No melena. Reports he is doing well overall he uses 2 L of oxygen he is on Eliquis for history of PE and DVT. He followed up with the Hematology DrDanyel Horton about 2 weeks ago reports labs done at that time. Reports no issues going on..brhistory:br72-year-old  male for follow-up 3 months. he has no rectal bleeding at this point. stool are solid. he is on mesalamine 2.4 g per day. he is also off of any steroidsbrflexible sigmoidoscopy May 2023 showed evidence of procto-colitis noted in descending/sigmoid colon/rectum. Another FSC in June 2023 - sigmoid colitis but reportedly rectum was normal. pathology from the sigmoid colon was consistent with chronic active colitis. this is thought to be either related to ulcerative colitis or immune check point inhibitor colitis but UC more likely. Colonoscopy 2022 - also mild chronic colitis (see below). he is now off of the Keytruda. I have had discussion with Dr. Horton on his previous visit in May 2023. at this time he is not having any abdominal pain diarrhea rectal bleeding. no abdominal pain and he feels well. he is on oxygen. he has no nausea vomiting dyspepsia. Creat nml / Hb 12.9 in Nov 2023Clinic Visit 2/4/2025:brThe patient, with indeterminate colitis and a history of lung and colon cancer, presents with rectal bleeding and diarrhea. He has been experiencing rectal bleeding and diarrhea for the past two weeks. The bleeding is described as BRB on toilet paper and in the toilet, not mixed with stool. There is also burning sensation during bowel movements after the diarrhea. He has bowel movements approximately five times a day due to diarrhea. No abdominal pain, nausea, or vomiting. He has a history of indeterminate colitis, with a differential diagnosis of ulcerative colitis versus immune-mediated colitis, more likely ulcerative colitis. He is on mesalamine, taking two pills daily, each 1.2 grams. For the past three months he has been the immunotherapy regimen of Opdivo and Yervoy, with Opdivo administered alone for two treatments and both drugs administered together on the fourth treatment. He has been prescribed prednisone 20 mg and Imodium for diarrhea management by another provider, but has not started either. He reports he underwent a colon resection for colon cancer in April 2024 at [facility]. I will request a copy of this report. His last colonoscopy/flex sig? was in January 2024 which showed no active inflammatory bowel disease but did reveal metastatic pulmonary cancer in the sigmoid area. He is on 2L O2. He continues to take Eliquis for his history of PE and DVT. Lab work yesterday at [facility] was as follows: Ferritin 12.4, Iron 30, Iron Sat 7%, TIBC wnl, WBC wnl, Hgb 12.5, Hct 38.8. 
br
br  Clinic visit 04/04/2025:
brThe patient experiences rectal pain, burning, itching with bowel movements. He has frequent bowel movements, approximately three to four times a day, with solid stools that are not loose or watery since completion of the prednisone course. Blood is noted on the tissue after wiping frequently, but not mixed in the stool. He is concerned with the frequency of bowel movements since completing the prednisone with 3-4 solid stools per day. Occasional discharge resembling pus and blood is also reported. A recent stool anand protectin in Feb was elevated at 795 and stool studies were negative for infections. He currently takes two tablets of 1.2g mesalamine twice daily for colitis. He also mentions abdominal pain associated with rectal pain. He continues to take pantoprazole for reflux, which he states works well. No nausea, vomiting, or trouble swallowing. He has recently been hospitalized for respiratory distress and continues to use home oxygen. He is also on Eliquis, managed by Dr Horton at [facility] for history of DVT and PE. He expresses difficulty in arranging transportation for medical procedures due to his need for a , but is willing to ask his son. He has a DNR order and has a living will in place, indicating his preferences for end-of-life care. He has a history of pulmonary adenocarcinoma in the colon and underwent a colon resection in April 2024. Given his history, the change in bowel habits he is concerned for potential recurrence or complications. 
chris perez

## 2025-04-04 NOTE — SERVICENOTES
Patient reports sever rectal pain, rectal itching and very frequent rectal bleeding.  Upon rectal exam he is noted to have  a medium-size external thrombosed hemorrhoid.  will give him Analpram samples today and sent a prescription in.  Recommended he use that 3 times daily. Also explained Curetis to him if insurance does not cover the cream.  Encouraged Sitz baths but he does not seem to be able to get in and out of a bath so I recommended  allowing warm water to run over his bottom for about 10 minutes a couple of times per day.  He showed me some tissue from the bathroom while he was here in the clinic and he had quite a bit of rectal bleeding noted on the tissue so I will discuss with Dr. Wright about a flex sig versus colonoscopy since he does have a history of pulmonary adenocarcinoma noted in his colon back in 2024.  He most likely needs to be scheduled in the hospital due to his history of  lung cancer and he is on home O2 as well as being on Eliquis.   He was recently hospitalized with respiratory distress so I think hospital wpu;d be safest.  I will request cardiac clearance and pulmonary clearance.   He continues to report frequent bowel movements but upon further discussion he describes the bowel movements as formed and solid they are no longer diarrhea since he completed the prednisone treatment but the frequency concerns him since that is not his normal.  His fecal calprotectin was elevated (795)  when checked in February.    Encouraged him to eat a high-fiber diet and avoid straining with any constipation.  Recommended he use a stool softener and MiraLax to prevent straining.  All questions addressed.